# Patient Record
Sex: FEMALE | Race: WHITE | RURAL
[De-identification: names, ages, dates, MRNs, and addresses within clinical notes are randomized per-mention and may not be internally consistent; named-entity substitution may affect disease eponyms.]

---

## 2017-02-17 ENCOUNTER — TELEPHONE (OUTPATIENT)
Dept: INTERNAL MEDICINE CLINIC | Age: 82
End: 2017-02-17

## 2017-02-17 NOTE — TELEPHONE ENCOUNTER
Chief Complaint   Patient presents with   Aleyda Isbell with the patient and she refuses to have her AWV performed, because she states that she can hardly walk and is feeling bad.

## 2017-04-06 DIAGNOSIS — I10 ESSENTIAL HYPERTENSION: ICD-10-CM

## 2017-04-06 RX ORDER — AMILORIDE HYDROCHLORIDE 5 MG/1
TABLET ORAL
Qty: 90 TAB | Refills: 1 | Status: SHIPPED | OUTPATIENT
Start: 2017-04-06 | End: 2017-10-21 | Stop reason: SDUPTHER

## 2017-05-23 ENCOUNTER — TELEPHONE (OUTPATIENT)
Dept: INTERNAL MEDICINE CLINIC | Age: 82
End: 2017-05-23

## 2017-05-23 RX ORDER — LEVOTHYROXINE SODIUM 88 UG/1
TABLET ORAL
Qty: 30 TAB | Refills: 0 | Status: SHIPPED | OUTPATIENT
Start: 2017-05-23 | End: 2017-06-30 | Stop reason: SDUPTHER

## 2017-05-23 NOTE — TELEPHONE ENCOUNTER
Requested Prescriptions     Pending Prescriptions Disp Refills    levothyroxine (SYNTHROID) 88 mcg tablet [Pharmacy Med Name: LEVOTHYROXINE 88 MCG TABLET] 90 Tab 1     Sig: take 1 tablet by mouth once daily BEFORE BREAKFAST FOR THYROID     Last office visit 10/19/16  Last filled 9/22/16  Changes made to medication on last visit none

## 2017-07-26 ENCOUNTER — OFFICE VISIT (OUTPATIENT)
Dept: INTERNAL MEDICINE CLINIC | Age: 82
End: 2017-07-26

## 2017-07-26 VITALS
WEIGHT: 200 LBS | BODY MASS INDEX: 37.76 KG/M2 | RESPIRATION RATE: 18 BRPM | HEIGHT: 61 IN | SYSTOLIC BLOOD PRESSURE: 206 MMHG | OXYGEN SATURATION: 95 % | DIASTOLIC BLOOD PRESSURE: 100 MMHG | TEMPERATURE: 96 F | HEART RATE: 73 BPM

## 2017-07-26 DIAGNOSIS — E11.65 TYPE 2 DIABETES MELLITUS WITH HYPERGLYCEMIA, WITHOUT LONG-TERM CURRENT USE OF INSULIN (HCC): Primary | ICD-10-CM

## 2017-07-26 DIAGNOSIS — Z23 ENCOUNTER FOR IMMUNIZATION: ICD-10-CM

## 2017-07-26 DIAGNOSIS — E08.42 DIABETIC POLYNEUROPATHY ASSOCIATED WITH DIABETES MELLITUS DUE TO UNDERLYING CONDITION (HCC): ICD-10-CM

## 2017-07-26 DIAGNOSIS — E03.9 HYPOTHYROIDISM, UNSPECIFIED TYPE: ICD-10-CM

## 2017-07-26 DIAGNOSIS — I10 ESSENTIAL HYPERTENSION: ICD-10-CM

## 2017-07-26 RX ORDER — LANCETS
EACH MISCELLANEOUS
Qty: 50 EACH | Refills: 6 | Status: SHIPPED | OUTPATIENT
Start: 2017-07-26 | End: 2019-10-14 | Stop reason: ALTCHOICE

## 2017-07-26 RX ORDER — LIDOCAINE 50 MG/G
0.25 OINTMENT TOPICAL
Qty: 60 G | Refills: 5 | Status: SHIPPED | OUTPATIENT
Start: 2017-07-26 | End: 2017-08-15 | Stop reason: ALTCHOICE

## 2017-07-26 RX ORDER — INSULIN PUMP SYRINGE, 3 ML
EACH MISCELLANEOUS
Qty: 1 KIT | Refills: 0 | Status: SHIPPED | OUTPATIENT
Start: 2017-07-26 | End: 2019-10-14 | Stop reason: ALTCHOICE

## 2017-07-26 RX ORDER — LEVOTHYROXINE SODIUM 88 UG/1
TABLET ORAL
Qty: 90 TAB | Refills: 3 | Status: SHIPPED | OUTPATIENT
Start: 2017-07-26 | End: 2018-07-23 | Stop reason: SDUPTHER

## 2017-07-26 NOTE — LETTER
7/28/2017 1:11 PM 
 
Ms. Santana Fees [address] [place] [id number] Dear Larri Fees: 
 
Please find your most recent results below. Resulted Orders HEMOGLOBIN A1C WITH EAG Result Value Ref Range Hemoglobin A1c 7.2 (H) 4.8 - 5.6 % Comment:  
            Pre-diabetes: 5.7 - 6.4 Diabetes: >6.4 Glycemic control for adults with diabetes: <7.0 Estimated average glucose 160 mg/dL Narrative Performed at:  [address]  [id number] : Carlee Horner MD, Phone:  [phone] METABOLIC PANEL, BASIC Result Value Ref Range Glucose 135 (H) 65 - 99 mg/dL BUN 22 8 - 27 mg/dL Creatinine 0.80 0.57 - 1.00 mg/dL GFR est non-AA 68 >59 mL/min/1.73 GFR est AA 78 >59 mL/min/1.73  
 BUN/Creatinine ratio 28 12 - 28 Sodium 138 134 - 144 mmol/L Potassium 4.6 3.5 - 5.2 mmol/L Chloride 100 96 - 106 mmol/L  
 CO2 22 18 - 29 mmol/L Calcium 9.4 8.7 - 10.3 mg/dL Narrative Performed at:  [address]  [id number] : Carlee Horner MD, Phone:  [phone] TSH 3RD GENERATION Result Value Ref Range TSH 3.430 0.450 - 4.500 uIU/mL Narrative Performed at:  [address]  [id number] : Carlee Horner MD, Phone:  [phone] RECOMMENDATIONS: 
The results of your most recent labs show normal/ stable results. Please follow up as scheduled. Please call me if you have any questions: [phone] Sincerely, Sue Pinto MD

## 2017-07-26 NOTE — PROGRESS NOTES
Subjective:     Eric Dixon is a 80 y.o. female seen for follow-up of diabetes. Refuses MWV. Gets very anxious whenever she comes to the doctor states she has whitecoat hypertension and when her blood pressures checked at home it is okay. She has had hypoglycemic attacks. .no  Blood sugar control has been unknown, meter broke  She has diabetes and hypertension. She wants to start checking her blood sugars. Eric Dixon has the additional concern of feet hurt and there is numbness and tingling. Takes her thyroid pill daily. Recently saw her eye doctor. Diabetic Review of Systems: no polyuria or polydipsia, no chest pain, dyspnea or TIA's, no numbness, tingling or pain in extremities. Allergies   Allergen Reactions    Sulfa (Sulfonamide Antibiotics) Unproven on Challenge       Diet and Lifestyle: follows a diabetic diet regularly, nonsmoker. Social History   Substance Use Topics    Smoking status: Former Smoker     Packs/day: 1.00     Years: 20.00     Types: Cigarettes     Quit date: 4/26/2003    Smokeless tobacco: Never Used    Alcohol use No        Lab Results  Component Value Date/Time   Hemoglobin A1c 7.2 07/26/2017 11:16 AM   Hemoglobin A1c 7.0 09/22/2016 12:21 PM   Hemoglobin A1c 7.4 11/06/2014 08:10 AM   Glucose 135 07/26/2017 11:16 AM   Microalb/Creat ratio (ug/mg creat.) 462.6 09/22/2016 12:40 PM   LDL, calculated 142 09/22/2016 12:21 PM   Creatinine 0.80 07/26/2017 11:16 AM      Lab Results  Component Value Date/Time   ALT (SGPT) 25 09/22/2016 12:21 PM   AST (SGOT) 25 09/22/2016 12:21 PM   Alk.  phosphatase 48 09/22/2016 12:21 PM   Bilirubin, total 0.3 09/22/2016 12:21 PM   Albumin 4.2 09/22/2016 12:21 PM   Protein, total 7.3 09/22/2016 12:21 PM   PLATELET 283 17/57/6539 12:21 PM       Lab Results  Component Value Date/Time   GFR est non-AA 68 07/26/2017 11:16 AM   GFR est AA 78 07/26/2017 11:16 AM   Creatinine 0.80 07/26/2017 11:16 AM   BUN 22 07/26/2017 11:16 AM   Sodium 138 07/26/2017 11:16 AM   Potassium 4.6 07/26/2017 11:16 AM   Chloride 100 07/26/2017 11:16 AM   CO2 22 07/26/2017 11:16 AM   Lab Results  Component Value Date/Time   TSH 3.430 07/26/2017 11:16 AM   T3 Uptake 32 07/12/2010 09:05 AM   T4, Free 1.45 07/18/2014 08:59 AM   T4, Total 8.5 07/12/2010 09:05 AM      Lab Results   Component Value Date/Time    Glucose 135 07/26/2017 11:16 AM                     Review of Systems  Pertinent items are noted in HPI. Objective:     Significant for the following  Elderly chronic ill    Visit Vitals    BP (!) 209/87 (BP 1 Location: Left arm, BP Patient Position: Sitting)    Pulse 73    Temp 96 °F (35.6 °C) (Oral)    Resp 18    Ht 5' 1\" (1.549 m)    Wt 200 lb (90.7 kg)    SpO2 95%    BMI 37.79 kg/m2     Appearance: alert, well appearing, and in no distress. Exam: heart sounds normal rate, regular rhythm, normal S1, S2, no murmurs, rubs, clicks or gallops, chest clear  Foot exam: Diabetic foot exam was performed. No obvious sores or red lesions. Sensation checked by monofilament exam which was did not feel the filament. Dorsalis pedis pulse was nonpalpable. Lab review: orders written for new lab studies as appropriate; see orders. Assessment/Plan:     Follow-up diabetes stable. Diabetic issues reviewed with her: all medications, side effects and compliance discussed carefully, foot care discussed and Podiatry visits discussed, annual eye examinations at Ophthalmology discussed and glycohemoglobin and other lab monitoring discussed. Chronic Conditions Addressed Today     1. Hypothyroidism     Relevant Medications     levothyroxine (SYNTHROID) 88 mcg tablet     Other Relevant Orders     TSH 3RD GENERATION (Completed)     FL HANDLG&/OR CONVEY OF SPEC FOR TR OFFICE TO LAB     FL COLLECTION VENOUS BLOOD,VENIPUNCTURE    2.  Hypertension     Relevant Orders     METABOLIC PANEL, BASIC (Completed)     FL HANDLG&/OR CONVEY OF SPEC FOR TR OFFICE TO LAB     FL COLLECTION VENOUS BLOOD,VENIPUNCTURE    3. Diabetic polyneuropathy associated with diabetes mellitus due to underlying condition ([facility])     Relevant Medications     levothyroxine (SYNTHROID) 88 mcg tablet     Other Relevant Orders     REFERRAL TO PODIATRY     SC HANDLG&/OR CONVEY OF SPEC FOR TR OFFICE TO LAB     SC COLLECTION VENOUS BLOOD,VENIPUNCTURE      Acute Diagnoses Addressed Today     Type 2 diabetes mellitus with hyperglycemia, without long-term current use of insulin ([facility])    -  Primary        Relevant Medications        levothyroxine (SYNTHROID) 88 mcg tablet        Other Relevant Orders        REFERRAL TO PODIATRY        HEMOGLOBIN A1C WITH EAG (Completed)        SC HANDLG&/OR CONVEY OF SPEC FOR TR OFFICE TO LAB        SC COLLECTION VENOUS BLOOD,VENIPUNCTURE    Encounter for immunization            Relevant Orders        PNEUMOCOCCAL CONJ VACCINE 13 VALENT IM (Completed)          ICD-10-CM ICD-9-CM    1. Type 2 diabetes mellitus with hyperglycemia, without long-term current use of insulin ([facility]) E11.65 250.00 REFERRAL TO PODIATRY     790.29 HEMOGLOBIN A1C WITH EAG      SC HANDLG&/OR CONVEY OF SPEC FOR TR OFFICE TO LAB      SC COLLECTION VENOUS BLOOD,VENIPUNCTURE   2. Diabetic polyneuropathy associated with diabetes mellitus due to underlying condition ([facility]) E08.42 249.60 REFERRAL TO PODIATRY     357.2 SC HANDLG&/OR CONVEY OF SPEC FOR TR OFFICE TO LAB      SC COLLECTION VENOUS BLOOD,VENIPUNCTURE   3. Essential hypertension R78 985.1 METABOLIC PANEL, BASIC      SC HANDLG&/OR CONVEY OF SPEC FOR TR OFFICE TO LAB      SC COLLECTION VENOUS BLOOD,VENIPUNCTURE   4. Hypothyroidism, unspecified type E03.9 244.9 TSH 3RD GENERATION      SC HANDLG&/OR CONVEY OF SPEC FOR TR OFFICE TO LAB      SC COLLECTION VENOUS BLOOD,VENIPUNCTURE   5.  Encounter for immunization Z23 V03.89 PNEUMOCOCCAL CONJ VACCINE 13 VALENT IM     Orders Placed This Encounter    PNEUMOCOCCAL CONJ VACCINE 13 VALENT IM    HEMOGLOBIN A1C WITH EAG    METABOLIC PANEL, BASIC    TSH 3RD GENERATION    REFERRAL TO PODIATRY     Referral Priority:   Routine     Referral Type:   Consultation     Referral Reason:   Specialty Services Required     Referred to Provider:   Toya Peres DPM    [place] HANDLG&/OR CONVEY OF SPEC FOR TR OFFICE TO LAB    [place] COLLECTION VENOUS BLOOD,VENIPUNCTURE    Blood-Glucose Meter monitoring kit     Sig: Check blood sugar daily Hilda     Dispense:  1 Kit     Refill:  0     E11.65 Dm    glucose blood VI test strips (ASCENSIA AUTODISC VI, ONE TOUCH ULTRA TEST VI) strip     Sig: daily     Dispense:  50 Strip     Refill:  6    Lancets misc     Sig: daily     Dispense:  50 Each     Refill:  6    lidocaine (XYLOCAINE) 5 % ointment     Sig: Apply 0.25 g to affected area three (3) times daily as needed for Pain. Dispense:  60 g     Refill:  5    levothyroxine (SYNTHROID) 88 mcg tablet     Sig: take 1 tablet by mouth once daily BEFORE BREAKFAST FOR THYROID     Dispense:  90 Tab     Refill:  3     Trial of lidocaine ointment for her pain consider lidocaine patches. Diabetes seems stable, diet controlled only. Hypertension blood pressure very elevated, suspect white coat in nature. Check blood pressures at home bring in diary.   Follow-up Disposition:  Return in about 3 months (around 10/26/2017) for medicare annual.

## 2017-07-26 NOTE — MR AVS SNAPSHOT
Visit Information Date & Time Provider Department Dept. Phone Encounter #  
 7/26/2017 10:15 AM Cassie Matias  [facility] [id number] Follow-up Instructions Return in about 3 months (around 10/26/2017) for medicare annual.  
  
Upcoming Health Maintenance Date Due  
 EYE EXAM RETINAL OR DILATED Q1 3/29/1943 DTaP/Tdap/Td series (1 - Tdap) 3/29/1954 ZOSTER VACCINE AGE 60> 1/29/1993 GLAUCOMA SCREENING Q2Y 3/29/1998 OSTEOPOROSIS SCREENING (DEXA) 3/29/1998 Pneumococcal 65+ Low/Medium Risk (1 of 2 - PCV13) 3/29/1998 MEDICARE YEARLY EXAM 3/29/1998 HEMOGLOBIN A1C Q6M 3/22/2017 INFLUENZA AGE 9 TO ADULT 8/1/2017 MICROALBUMIN Q1 9/22/2017 LIPID PANEL Q1 9/22/2017 FOOT EXAM Q1 7/26/2018 Allergies as of 7/26/2017  Review Complete On: 7/26/2017 By: Cassie Matias MD  
  
 Severity Noted Reaction Type Reactions Sulfa (Sulfonamide Antibiotics)  04/26/2010    Unproven on Challenge Current Immunizations  Reviewed on 7/26/2017 Name Date Pneumococcal Conjugate (PCV-13)  Incomplete Reviewed by Cassie Matias MD on 7/26/2017 at 11:49 AM  
You Were Diagnosed With   
  
 Codes Comments Type 2 diabetes mellitus with hyperglycemia, without long-term current use of insulin ([facility])    -  Primary ICD-10-CM: E11.65 ICD-9-CM: 250.00, 790.29 Diabetic polyneuropathy associated with diabetes mellitus due to underlying condition ([facility]     ICD-10-CM: O96.74 
ICD-9-CM: 249.60, 357.2 Essential hypertension     ICD-10-CM: I10 
ICD-9-CM: 401.9 Hypothyroidism, unspecified type     ICD-10-CM: E03.9 ICD-9-CM: 244.9 Encounter for immunization     ICD-10-CM: R58 ICD-9-CM: V03.89 Vitals BP Pulse Temp Resp Height(growth percentile) Weight(growth percentile) (!) 206/100 73 96 °F (35.6 °C) (Oral) 18 5' 1\" (1.549 m) 200 lb (90.7 kg) SpO2 BMI OB Status Smoking Status 95% 37.79 kg/m2 Postmenopausal Former Smoker Vitals History BMI and BSA Data Body Mass Index Body Surface Area  
 37.79 kg/m 2 1.98 m 2 Preferred Pharmacy Pharmacy Name Phone Robbie Leyva [address] [facility] [phone] Your Updated Medication List  
  
   
This list is accurate as of: 7/26/17 11:51 AM.  Always use your most recent med list.  
  
  
  
  
 aMILoride 5 mg tablet Commonly known as:  MIDAMOR  
take 1 tablet by mouth once daily Blood-Glucose Meter monitoring kit Check blood sugar daily Hilda  
  
 glucose blood VI test strips strip Commonly known as:  ASCENSIA AUTODISC VI, ONE TOUCH ULTRA TEST VI  
daily Lancets Misc  
daily  
  
 levothyroxine 88 mcg tablet Commonly known as:  SYNTHROID  
take 1 tablet by mouth once daily BEFORE BREAKFAST FOR THYROID  
  
 lidocaine 5 % ointment Commonly known as:  XYLOCAINE Apply 0.25 g to affected area three (3) times daily as needed for Pain. lisinopril 10 mg tablet Commonly known as:  Therisa Semen Take 1 Tab by mouth two (2) times a day. metFORMIN 500 mg tablet Commonly known as:  GLUCOPHAGE  
take 1 tablet by mouth once daily WITH BREAKFAST  
  
 propranolol 20 mg tablet Commonly known as:  INDERAL Take 1 Tab by mouth three (3) times daily. Prescriptions Printed Refills Blood-Glucose Meter monitoring kit 0 Sig: Check blood sugar daily Hilda Class: Print  
 glucose blood VI test strips (ASCENSIA AUTODISC VI, ONE TOUCH ULTRA TEST VI) strip 6 Sig: daily Class: Print Lancets misc 6 Sig: daily Class: Print Prescriptions Sent to Pharmacy Refills  
 lidocaine (XYLOCAINE) 5 % ointment 5 Sig: Apply 0.25 g to affected area three (3) times daily as needed for Pain. Class: Normal  
 Pharmacy: [facility] [address] [facility] Ph #: [phone]  Route: Topical  
 levothyroxine (SYNTHROID) 88 mcg tablet 3 Sig: take 1 tablet by mouth once daily BEFORE BREAKFAST FOR THYROID Class: Normal  
 Pharmacy: [facility] [place] [address] [address] Ph #: [phone] We Performed the Following HEMOGLOBIN A1C WITH EAG [83990 CPT(R)] METABOLIC PANEL, BASIC [64510 CPT(R)] PNEUMOCOCCAL CONJ VACCINE 13 VALENT IM [id number] CPT(R)] [place] COLLECTION VENOUS BLOOD,VENIPUNCTURE [id number] CPT(R)] [place] HANDLG&/OR CONVEY OF SPEC FOR TR OFFICE TO LAB [22436 CPT(R)] REFERRAL TO PODIATRY [REF90 Custom] Comments:  
 Please evaluate patient for Dm neuropathy and foot issues. TSH 3RD GENERATION [45444 CPT(R)] Follow-up Instructions Return in about 3 months (around 10/26/2017) for medicare annual.  
  
  
Referral Information Referral ID Referred By Referred To  
  
 [id number] [address]  Phone: [phone] Fax: [phone] Visits Status Start Date End Date 1 New Request 17 If your referral has a status of pending review or denied, additional information will be sent to support the outcome of this decision. Introducing [facility]! Lucy Lopez introduces Accion patient portal. Now you can access parts of your medical record, email your doctor's office, and request medication refills online. 1. In your internet browser, go to [email or web address] 2. Click on the First Time User? Click Here link in the Sign In box. You will see the New Member Sign Up page. 3. Enter your Accion Access Code exactly as it appears below. You will not need to use this code after youve completed the sign-up process. If you do not sign up before the expiration date, you must request a new code. · Accion Access Code: [email or web address] Expires: 10/24/2017 11:51 AM 
 
 4. Enter the last four digits of your Social Security Number (xxxx) and Date of Birth (mm/dd/yyyy) as indicated and click Submit. You will be taken to the next sign-up page. 5. Create a Italia Online ID. This will be your Italia Online login ID and cannot be changed, so think of one that is secure and easy to remember. 6. Create a Italia Online password. You can change your password at any time. 7. Enter your Password Reset Question and Answer. This can be used at a later time if you forget your password. 8. Enter your e-mail address. You will receive e-mail notification when new information is available in [address] 9. Click Sign Up. You can now view and download portions of your medical record. 10. Click the Download Summary menu link to download a portable copy of your medical information. If you have questions, please visit the Frequently Asked Questions section of the Italia Online website. Remember, Italia Online is NOT to be used for urgent needs. For medical emergencies, dial 911. Now available from your iPhone and Android! Please provide this summary of care documentation to your next provider. Your primary care clinician is listed as Damien Gowers. If you have any questions after today's visit, please call [phone].

## 2017-07-26 NOTE — PROGRESS NOTES
Chief Complaint   Patient presents with    Foot Pain     I have reviewed the patient's medical history in detail and updated the computerized patient record. Health Maintenance reviewed. 1. Have you been to the ER, urgent care clinic since your last visit? Hospitalized since your last visit?no    2. Have you seen or consulted any other health care providers outside of the [address] [place] [address] since your last visit? Include any pap smears or colon screening. no    Do you have an [address] in place in the event that you have a healthcare crisis that could impact your decision making as it pertains to your health?no    Would you like information about the [address] [place] [address] given. no

## 2017-07-27 LAB
BUN SERPL-MCNC: 22 MG/DL (ref 8–27)
BUN/CREAT SERPL: 28 (ref 12–28)
CALCIUM SERPL-MCNC: 9.4 MG/DL (ref 8.7–10.3)
CHLORIDE SERPL-SCNC: 100 MMOL/L (ref 96–106)
CO2 SERPL-SCNC: 22 MMOL/L (ref 18–29)
CREAT SERPL-MCNC: 0.8 MG/DL (ref 0.57–1)
EST. AVERAGE GLUCOSE BLD GHB EST-MCNC: 160 MG/DL
GLUCOSE SERPL-MCNC: 135 MG/DL (ref 65–99)
HBA1C MFR BLD: 7.2 % (ref 4.8–5.6)
POTASSIUM SERPL-SCNC: 4.6 MMOL/L (ref 3.5–5.2)
SODIUM SERPL-SCNC: 138 MMOL/L (ref 134–144)
TSH SERPL DL<=0.005 MIU/L-ACNC: 3.43 UIU/ML (ref 0.45–4.5)

## 2017-07-29 PROBLEM — E08.42 DIABETIC POLYNEUROPATHY ASSOCIATED WITH DIABETES MELLITUS DUE TO UNDERLYING CONDITION (HCC): Status: ACTIVE | Noted: 2017-07-29

## 2017-08-01 ENCOUNTER — TELEPHONE (OUTPATIENT)
Dept: INTERNAL MEDICINE CLINIC | Age: 82
End: 2017-08-01

## 2017-08-01 NOTE — TELEPHONE ENCOUNTER
8/1/17 @ 10:19 AM Called , PA initiated with [facility] for Lidocaine 5% ointment. Turn around time for outcome, 24 to 72 hours. Decision will be faxed to [phone] will be mailed to patient. Case ID: [id number].

## 2017-08-14 NOTE — TELEPHONE ENCOUNTER
PA request for Lidocaine ointment has been denied - this drug is use for a diabetic nerve condition is not a medically accepted diagnosis for an off label drug such as Lidocaine 5% - message sent to Dr Kasey Michel LPN  3/67/3056  7:76 PM

## 2017-08-15 ENCOUNTER — TELEPHONE (OUTPATIENT)
Dept: INTERNAL MEDICINE CLINIC | Age: 82
End: 2017-08-15

## 2017-08-15 RX ORDER — LIDOCAINE 50 MG/G
1 PATCH TOPICAL EVERY 12 HOURS
Qty: 1 PACKAGE | Refills: 5 | Status: SHIPPED | OUTPATIENT
Start: 2017-08-15 | End: 2019-10-14 | Stop reason: ALTCHOICE

## 2017-08-15 NOTE — TELEPHONE ENCOUNTER
8/15/17 @ 14:10 Called [phone] for PA on Lidocaine 5% patch with, PA rep Nolan. CaseID: [id number]. Case pending review. Outcome will be faxed to office within 24 to 67 hours,and letter mailed to patient.

## 2017-08-16 NOTE — TELEPHONE ENCOUNTER
[facility] approval of PA request for Lidocaine 5% patch - good until 2/12/18  Froilan Gonzales LPN  2/22/9612  9:26 PM

## 2017-08-31 RX ORDER — PROPRANOLOL HYDROCHLORIDE 20 MG/1
20 TABLET ORAL 3 TIMES DAILY
Qty: 270 TAB | Refills: 1 | Status: SHIPPED | OUTPATIENT
Start: 2017-08-31 | End: 2018-01-09 | Stop reason: SDUPTHER

## 2017-08-31 NOTE — TELEPHONE ENCOUNTER
Requested Prescriptions     Pending Prescriptions Disp Refills    propranolol (INDERAL) 20 mg tablet 270 Tab 1     Sig: Take 1 Tab by mouth three (3) times daily. Pt called and said she needs new prescription sent to the pharmacy. Pharmacist said that it was written wrong. Ms. Izabela Lugo said she takes 3 a day not 1 a day.

## 2017-08-31 NOTE — TELEPHONE ENCOUNTER
Last office visit:  7/26/17  Last filled:  10/19/16 #270 X 1 refill (propranolol)  No changes  Follow up in 1 month with Dr Danelle Blair

## 2017-10-21 DIAGNOSIS — I10 ESSENTIAL HYPERTENSION: ICD-10-CM

## 2017-10-21 RX ORDER — AMILORIDE HYDROCHLORIDE 5 MG/1
TABLET ORAL
Qty: 90 TAB | Refills: 1 | Status: SHIPPED | OUTPATIENT
Start: 2017-10-21 | End: 2018-06-03 | Stop reason: SDUPTHER

## 2018-01-09 RX ORDER — PROPRANOLOL HYDROCHLORIDE 20 MG/1
20 TABLET ORAL 3 TIMES DAILY
Qty: 270 TAB | Refills: 1 | Status: SHIPPED | OUTPATIENT
Start: 2018-01-09 | End: 2018-09-10 | Stop reason: SDUPTHER

## 2018-01-09 NOTE — TELEPHONE ENCOUNTER
Requested Prescriptions     Pending Prescriptions Disp Refills    propranolol (INDERAL) 20 mg tablet 270 Tab 1     Sig: Take 1 Tab by mouth three (3) times daily.

## 2018-01-09 NOTE — TELEPHONE ENCOUNTER
Last office visit:  7/26/17  Last filled:  Propranolol 8/31/17 #270 X 1  No changes    No follow up scheduled

## 2018-01-10 NOTE — TELEPHONE ENCOUNTER
LM X 2 that I need a return call to let me know which pharmacy she wants medication sent to  Corey Boswell LPN  2/37/1432  6:24 PM

## 2018-01-12 ENCOUNTER — TELEPHONE (OUTPATIENT)
Dept: INTERNAL MEDICINE CLINIC | Age: 83
End: 2018-01-12

## 2018-01-15 ENCOUNTER — TELEPHONE (OUTPATIENT)
Dept: INTERNAL MEDICINE CLINIC | Age: 83
End: 2018-01-15

## 2018-01-15 NOTE — TELEPHONE ENCOUNTER
Pt called and would like to know if her prescription can be sent to [facility] in [address] [facility]. She said she has a hard time getting to the office.

## 2018-01-15 NOTE — TELEPHONE ENCOUNTER
Per order of Britni Nichole MD called in prescription for Propranolol 20mg #270 X 1 [facility] [address] [facility] [place]  6/56/8792  31:81 AM

## 2018-06-03 DIAGNOSIS — I10 ESSENTIAL HYPERTENSION: ICD-10-CM

## 2018-06-04 RX ORDER — AMILORIDE HYDROCHLORIDE 5 MG/1
TABLET ORAL
Qty: 90 TAB | Refills: 1 | Status: SHIPPED | OUTPATIENT
Start: 2018-06-04 | End: 2018-12-04 | Stop reason: SDUPTHER

## 2018-09-10 RX ORDER — PROPRANOLOL HYDROCHLORIDE 20 MG/1
TABLET ORAL
Qty: 270 TAB | Refills: 1 | Status: SHIPPED | OUTPATIENT
Start: 2018-09-10 | End: 2019-03-04 | Stop reason: SDUPTHER

## 2018-12-04 DIAGNOSIS — I10 ESSENTIAL HYPERTENSION: ICD-10-CM

## 2018-12-04 RX ORDER — AMILORIDE HYDROCHLORIDE 5 MG/1
TABLET ORAL
Qty: 90 TAB | Refills: 1 | Status: SHIPPED | OUTPATIENT
Start: 2018-12-04 | End: 2019-06-12 | Stop reason: SDUPTHER

## 2019-01-22 RX ORDER — LEVOTHYROXINE SODIUM 88 UG/1
TABLET ORAL
Qty: 90 TAB | Refills: 1 | Status: SHIPPED | OUTPATIENT
Start: 2019-01-22 | End: 2019-07-29 | Stop reason: SDUPTHER

## 2019-03-04 RX ORDER — PROPRANOLOL HYDROCHLORIDE 20 MG/1
TABLET ORAL
Qty: 270 TAB | Refills: 1 | Status: SHIPPED | OUTPATIENT
Start: 2019-03-04 | End: 2019-08-28 | Stop reason: SDUPTHER

## 2019-06-12 DIAGNOSIS — I10 ESSENTIAL HYPERTENSION: ICD-10-CM

## 2019-06-12 RX ORDER — AMILORIDE HYDROCHLORIDE 5 MG/1
TABLET ORAL
Qty: 90 TAB | Refills: 1 | Status: SHIPPED | OUTPATIENT
Start: 2019-06-12 | End: 2019-10-14 | Stop reason: SDUPTHER

## 2019-10-14 ENCOUNTER — OFFICE VISIT (OUTPATIENT)
Dept: INTERNAL MEDICINE CLINIC | Age: 84
End: 2019-10-14

## 2019-10-14 VITALS
SYSTOLIC BLOOD PRESSURE: 179 MMHG | HEART RATE: 73 BPM | TEMPERATURE: 97.5 F | HEIGHT: 61 IN | DIASTOLIC BLOOD PRESSURE: 82 MMHG | RESPIRATION RATE: 18 BRPM | OXYGEN SATURATION: 96 % | BODY MASS INDEX: 37.38 KG/M2 | WEIGHT: 198 LBS

## 2019-10-14 DIAGNOSIS — E03.9 HYPOTHYROIDISM, UNSPECIFIED TYPE: ICD-10-CM

## 2019-10-14 DIAGNOSIS — I10 ESSENTIAL HYPERTENSION: ICD-10-CM

## 2019-10-14 DIAGNOSIS — Z13.39 SCREENING FOR ALCOHOLISM: ICD-10-CM

## 2019-10-14 DIAGNOSIS — Z13.31 SCREENING FOR DEPRESSION: ICD-10-CM

## 2019-10-14 DIAGNOSIS — Z00.00 MEDICARE ANNUAL WELLNESS VISIT, SUBSEQUENT: Primary | ICD-10-CM

## 2019-10-14 DIAGNOSIS — E66.01 SEVERE OBESITY (HCC): ICD-10-CM

## 2019-10-14 DIAGNOSIS — E78.5 HYPERLIPIDEMIA, UNSPECIFIED HYPERLIPIDEMIA TYPE: ICD-10-CM

## 2019-10-14 DIAGNOSIS — E11.40 TYPE 2 DIABETES, CONTROLLED, WITH NEUROPATHY (HCC): ICD-10-CM

## 2019-10-14 RX ORDER — LEVOTHYROXINE SODIUM 88 UG/1
TABLET ORAL
Qty: 90 TAB | Refills: 1 | Status: SHIPPED | OUTPATIENT
Start: 2019-10-14 | End: 2020-04-27

## 2019-10-14 RX ORDER — AMILORIDE HYDROCHLORIDE 5 MG/1
TABLET ORAL
Qty: 90 TAB | Refills: 1 | Status: SHIPPED | OUTPATIENT
Start: 2019-10-14 | End: 2020-06-21

## 2019-10-14 RX ORDER — VALSARTAN 40 MG/1
40 TABLET ORAL DAILY
Qty: 30 TAB | Refills: 2 | Status: SHIPPED | OUTPATIENT
Start: 2019-10-14 | End: 2019-11-11 | Stop reason: SINTOL

## 2019-10-14 RX ORDER — PROPRANOLOL HYDROCHLORIDE 20 MG/1
TABLET ORAL
Qty: 270 TAB | Refills: 1 | Status: SHIPPED | OUTPATIENT
Start: 2019-10-14 | End: 2020-05-27

## 2019-10-14 NOTE — PROGRESS NOTES
Chief Complaint   Patient presents with   [facility] Annual Wellness Visit     I have reviewed the patient's medical history in detail and updated the computerized patient record. Health Maintenance reviewed. 1. Have you been to the ER, urgent care clinic since your last visit? Hospitalized since your last visit?no    2. Have you seen or consulted any other health care providers outside of the [address] [place] [address] [place] since your last visit? Include any pap smears or colon screening. No      Encouraged pt to discuss pt's wishes with spouse/partner/family and bring them in the next appt to follow thru with the Advanced Directive    Fall Risk Assessment, last 12 mths 10/14/2019   Able to walk? Yes   Fall in past 12 months? No       3 most recent PHQ Screens 10/14/2019   Little interest or pleasure in doing things Several days   Feeling down, depressed, irritable, or hopeless Several days   Total Score PHQ 2 2       Abuse Screening Questionnaire 10/14/2019   Do you ever feel afraid of your partner? N   Are you in a relationship with someone who physically or mentally threatens you? N   Is it safe for you to go home?  Y       ADL Assessment 10/14/2019   Feeding yourself No Help Needed   Getting from bed to chair No Help Needed   Getting dressed No Help Needed   Bathing or showering No Help Needed   Walk across the room (includes cane/walker) No Help Needed   Using the telphone No Help Needed   Taking your medications No Help Needed   Preparing meals No Help Needed   Managing money (expenses/bills) No Help Needed   Moderately strenuous housework (laundry) No Help Needed   Shopping for personal items (toiletries/medicines) No Help Needed   Shopping for groceries No Help Needed   Driving No Help Needed   Climbing a flight of stairs Help Needed   Getting to places beyond walking distances No Help Needed

## 2019-10-14 NOTE — PROGRESS NOTES
This is the Subsequent Medicare Annual Wellness Exam, performed 12 months or more after the Initial AWV or the last Subsequent AWV    I have reviewed the patient's medical history in detail and updated the computerized patient record. History     Here with daughter, no c/o, not had Dm care in a while, No hypos, last A1C was 7.2 in 2017. Eneergy OK Wt stable    Past Medical History:   Diagnosis Date    Allergic rhinitis     Diabetes mellitus 11/2014    Grave's disease 2001    Dr. Tania Castaneda Hyperlipidemia     Hypertension     Hypothyroidism 2001    Osteopenia 2006    Peripheral neuropathy       Past Surgical History:   Procedure Laterality Date    [address]  1994   [address]     Current Outpatient Medications   Medication Sig Dispense Refill    propranolol (INDERAL) 20 mg tablet take 1 tablet by mouth three times a day 270 Tab 0    levothyroxine (SYNTHROID) 88 mcg tablet take 1 tablet by mouth once daily before breakfast 90 Tab 0    aMILoride (MIDAMOR) 5 mg tablet take 1 tablet by mouth daily 90 Tab 1    lidocaine (LIDODERM) 5 % 1 Patch by TransDERmal route every twelve (12) hours. Apply patch to the affected area for 12 hours a day and remove for 12 hours a day. 1 Package 5    Blood-Glucose Meter monitoring kit Check blood sugar daily Hilda 1 Kit 0    glucose blood VI test strips (ASCENSIA AUTODISC VI, ONE TOUCH ULTRA TEST VI) strip daily 50 Strip 6    Lancets misc daily 50 Each 6    lisinopril (PRINIVIL, ZESTRIL) 10 mg tablet Take 1 Tab by mouth two (2) times a day. 180 Tab 1    metFORMIN (GLUCOPHAGE) 500 mg tablet take 1 tablet by mouth once daily WITH BREAKFAST 90 Tab 3     Allergies   Allergen Reactions    Sulfa (Sulfonamide Antibiotics) Unproven on Challenge     History reviewed. No pertinent family history.   Social History     Tobacco Use    Smoking status: Former Smoker     Packs/day: 1.00     Years: 20.00     Pack years: 20.00     Types: Cigarettes Last attempt to quit: 2003     Years since quittin.4    Smokeless tobacco: Never Used   Substance Use Topics    Alcohol use: No     Patient Active Problem List   Diagnosis Code    Hypertension I10    Hyperlipidemia E78.5    Type 2 diabetes, controlled, with neuropathy [facility] E11.40    Hypothyroidism E03.9    Diabetic polyneuropathy associated with diabetes mellitus due to underlying condition ([facility] E08.42    Severe obesity ([facility] E66.01       Depression Risk Factor Screening:     3 most recent PHQ Screens 10/14/2019   Little interest or pleasure in doing things Several days   Feeling down, depressed, irritable, or hopeless Several days   Total Score PHQ 2 2     Alcohol Risk Factor Screening: You do not drink alcohol or very rarely. Functional Ability and Level of Safety:   Hearing Loss  Hearing is good. Activities of Daily Living  The home contains: no safety equipment. and cane  Patient does total self care    Fall Risk  Fall Risk Assessment, last 12 mths 10/14/2019   Able to walk? Yes   Fall in past 12 months? No       Abuse Screen  Patient is not abused    Cognitive Screening   Evaluation of Cognitive Function:  Has your family/caregiver stated any concerns about your memory: no  Normal, Clock Drawing test    Patient Care Team   Patient Care Team:  Mara Farah MD as PCP - General (Family Practice)  [id number]    Visit Vitals  /82   Pulse 73   Temp 97.5 °F (36.4 °C) (Oral)   Resp 18   Ht 5' 1\" (1.549 m)   Wt 198 lb (89.8 kg)   SpO2 96%   BMI 37.41 kg/m²     WD WN female NAD  Heart RRR without murmers clicks or rubs  Lungs CTA  Abdo soft nontender  Ext no edema    Assessment/Plan   Education and counseling provided:  End-of-Life planning (with patient's consent)  Influenza Vaccine  Cardiovascular screening blood test      ICD-10-CM ICD-9-CM    1. Medicare annual wellness visit, subsequent Z00.00 V70.0    2. Severe obesity ([facility] E66.01 278.01    3.  Screening for alcoholism Z13.39 V79.1 NC ANNUAL ALCOHOL SCREEN 15 MIN   4. Screening for depression Z13.31 V79.0 DEPRESSION SCREEN ANNUAL   5. Essential hypertension I10 401.9 valsartan (DIOVAN) 40 mg tablet      propranolol (INDERAL) 20 mg tablet      aMILoride (MIDAMOR) 5 mg tablet      METABOLIC PANEL, COMPREHENSIVE      CBC W/O DIFF   6. Hyperlipidemia, unspecified hyperlipidemia type E78.5 272.4 LIPID PANEL   7. Type 2 diabetes, controlled, with neuropathy (HCC) E11.40 250.60 REFERRAL TO OPTOMETRY     357.2 HEMOGLOBIN A1C WITH EAG   8. Hypothyroidism, unspecified type E03.9 244.9 levothyroxine (SYNTHROID) 88 mcg tablet      TSH 3RD GENERATION     Orders Placed This Encounter    Depression Screen Annual    TSH 3RD GENERATION     Standing Status:   Future     Standing Expiration Date:   2/07/7346    METABOLIC PANEL, COMPREHENSIVE     Standing Status:   Future     Standing Expiration Date:   4/15/2020    HEMOGLOBIN A1C WITH EAG     Standing Status:   Future     Standing Expiration Date:   4/12/2020    LIPID PANEL     Standing Status:   Future     Standing Expiration Date:   4/15/2020    CBC W/O DIFF     Standing Status:   Future     Standing Expiration Date:   4/15/2020    REFERRAL TO OPTOMETRY     Referral Priority:   Routine     Referral Type:   Consultation     Referral Reason:   Specialty Services Required     Referred to Provider:   Swathi Mike III, OD    Annual  Alcohol Screen 15 min ()    valsartan (DIOVAN) 40 mg tablet     Sig: Take 1 Tab by mouth daily. Dispense:  30 Tab     Refill:  2    propranolol (INDERAL) 20 mg tablet     Sig: take 1 tablet by mouth three times a day     Dispense:  270 Tab     Refill:  1     Appointment needed to refill this medication again.     levothyroxine (SYNTHROID) 88 mcg tablet     Sig: take 1 tablet by mouth once daily before breakfast     Dispense:  90 Tab     Refill:  1    aMILoride (MIDAMOR) 5 mg tablet     Sig: take 1 tablet by mouth daily     Dispense: 90 Tab     Refill:  1     HTN needs to be better, start diovan  Discussed possible side affects, precautions, and drug interactions and possible benefits of the medication(s). Dm labs to eval  euthyroid    Health Maintenance Due   Topic Date Due    EYE EXAM RETINAL OR DILATED  03/29/1943    DTaP/Tdap/Td series (1 - Tdap) 03/29/1954    Shingrix Vaccine Age 50> (1 of 2) 03/29/1983    GLAUCOMA SCREENING Q2Y  03/29/1998    Bone Densitometry (Dexa) Screening  03/29/1998    MICROALBUMIN Q1  09/22/2017    LIPID PANEL Q1  09/22/2017    HEMOGLOBIN A1C Q6M  01/26/2018    FOOT EXAM Q1  07/26/2018    Pneumococcal 65+ years (2 of 2 - PPSV23) 07/26/2018     Follow-up and Dispositions    · Return in about 4 weeks (around 11/11/2019) for routine follow up.

## 2019-10-14 NOTE — PATIENT INSTRUCTIONS
Medicare Wellness Visit, Female The best way to live healthy is to have a lifestyle where you eat a well-balanced diet, exercise regularly, limit alcohol use, and quit all forms of tobacco/nicotine, if applicable. Regular preventive services are another way to keep healthy. Preventive services (vaccines, screening tests, monitoring & exams) can help personalize your care plan, which helps you manage your own care. Screening tests can find health problems at the earliest stages, when they are easiest to treat. [facility] follows the current, evidence-based guidelines published by the [facility] ([facility]) when recommending preventive services for our patients. Because we follow these guidelines, sometimes recommendations change over time as research supports it. (For example, mammograms used to be recommended annually. Even though Medicare will still pay for an annual mammogram, the newer guidelines recommend a mammogram every two years for women of average risk.) Of course, you and your doctor may decide to screen more often for some diseases, based on your risk and your health status. Preventive services for you include: - Medicare offers their members a free annual wellness visit, which is time for you and your primary care provider to discuss and plan for your preventive service needs. Take advantage of this benefit every year! 
-All adults over the age of 72 should receive the recommended pneumonia vaccines. Current USPSTF guidelines recommend a series of two vaccines for the best pneumonia protection.  
-All adults should have a flu vaccine yearly and a tetanus vaccine every 10 years. All adults age 61 and older should receive a shingles vaccine once in their lifetime.   
-A bone mass density test is recommended when a woman turns 65 to screen for osteoporosis. This test is only recommended one time, as a screening. Some providers will use this same test as a disease monitoring tool if you already have osteoporosis. -All adults age 38-68 who are overweight should have a diabetes screening test once every three years.  
-Other screening tests and preventive services for persons with diabetes include: an eye exam to screen for diabetic retinopathy, a kidney function test, a foot exam, and stricter control over your cholesterol.  
-Cardiovascular screening for adults with routine risk involves an electrocardiogram (ECG) at intervals determined by your doctor.  
-Colorectal cancer screenings should be done for adults age 54-65 with no increased risk factors for colorectal cancer. There are a number of acceptable methods of screening for this type of cancer. Each test has its own benefits and drawbacks. Discuss with your doctor what is most appropriate for you during your annual wellness visit. The different tests include: colonoscopy (considered the best screening method), a fecal occult blood test, a fecal DNA test, and sigmoidoscopy. -Breast cancer screenings are recommended every other year for women of normal risk, age 54-69. 
-Cervical cancer screenings for women over age 72 are only recommended with certain risk factors.  
-All adults born between [facility] should be screened once for Hepatitis C. Here is a list of your current Health Maintenance items (your personalized list of preventive services) with a due date: 
Health Maintenance Due Topic Date Due  Eye Exam  03/29/1943  
 DTaP/Tdap/Td  (1 - Tdap) 03/29/1954  Shingles Vaccine (1 of 2) 03/29/1983  Glaucoma Screening   03/29/1998  Bone Mineral Density   03/29/1998  Albumin Urine Test  09/22/2017  Cholesterol Test   09/22/2017  Hemoglobin A1C    01/26/2018 [facility] Diabetic [facility]  07/26/2018  Pneumococcal Vaccine (2 of 2 - PPSV23) 07/26/2018

## 2019-10-31 LAB
ALBUMIN SERPL-MCNC: 3.7 G/DL (ref 3.5–4.7)
ALBUMIN/GLOB SERPL: 1.2 {RATIO} (ref 1.2–2.2)
ALP SERPL-CCNC: 57 IU/L (ref 39–117)
ALT SERPL-CCNC: 22 IU/L (ref 0–32)
AST SERPL-CCNC: 21 IU/L (ref 0–40)
BILIRUB SERPL-MCNC: 0.3 MG/DL (ref 0–1.2)
BUN SERPL-MCNC: 27 MG/DL (ref 8–27)
BUN/CREAT SERPL: 25 (ref 12–28)
CALCIUM SERPL-MCNC: 8.7 MG/DL (ref 8.7–10.3)
CHLORIDE SERPL-SCNC: 101 MMOL/L (ref 96–106)
CHOLEST SERPL-MCNC: 218 MG/DL (ref 100–199)
CO2 SERPL-SCNC: 24 MMOL/L (ref 20–29)
CREAT SERPL-MCNC: 1.06 MG/DL (ref 0.57–1)
ERYTHROCYTE [DISTWIDTH] IN BLOOD BY AUTOMATED COUNT: 12.7 % (ref 12.3–15.4)
EST. AVERAGE GLUCOSE BLD GHB EST-MCNC: 163 MG/DL
GLOBULIN SER CALC-MCNC: 3.2 G/DL (ref 1.5–4.5)
GLUCOSE SERPL-MCNC: 172 MG/DL (ref 65–99)
HBA1C MFR BLD: 7.3 % (ref 4.8–5.6)
HCT VFR BLD AUTO: 39.1 % (ref 34–46.6)
HDLC SERPL-MCNC: 41 MG/DL
HGB BLD-MCNC: 13 G/DL (ref 11.1–15.9)
INTERPRETATION, 910389: NORMAL
INTERPRETATION: NORMAL
LDLC SERPL CALC-MCNC: 138 MG/DL (ref 0–99)
Lab: NORMAL
MCH RBC QN AUTO: 30.9 PG (ref 26.6–33)
MCHC RBC AUTO-ENTMCNC: 33.2 G/DL (ref 31.5–35.7)
MCV RBC AUTO: 93 FL (ref 79–97)
PDF IMAGE, 910387: NORMAL
PLATELET # BLD AUTO: 190 X10E3/UL (ref 150–450)
POTASSIUM SERPL-SCNC: 4.5 MMOL/L (ref 3.5–5.2)
PROT SERPL-MCNC: 6.9 G/DL (ref 6–8.5)
RBC # BLD AUTO: 4.21 X10E6/UL (ref 3.77–5.28)
SODIUM SERPL-SCNC: 138 MMOL/L (ref 134–144)
TRIGL SERPL-MCNC: 196 MG/DL (ref 0–149)
TSH SERPL DL<=0.005 MIU/L-ACNC: 5.17 UIU/ML (ref 0.45–4.5)
VLDLC SERPL CALC-MCNC: 39 MG/DL (ref 5–40)
WBC # BLD AUTO: 10.2 X10E3/UL (ref 3.4–10.8)

## 2019-11-04 ENCOUNTER — TELEPHONE (OUTPATIENT)
Dept: INTERNAL MEDICINE CLINIC | Age: 84
End: 2019-11-04

## 2019-11-04 NOTE — TELEPHONE ENCOUNTER
----- Message from Anthony Mathew sent at 11/4/2019  3:32 PM EST -----  Regarding: Dr. Mary Aranda Message/Vendor Calls    Caller's first and last name: Patient       Reason for call: Pt is returning a call from the office.        Callback required yes/no and why: yes       Best contact number(s): [phone]      Details to clarify the request:      Anthony Mathew

## 2019-11-11 ENCOUNTER — OFFICE VISIT (OUTPATIENT)
Dept: INTERNAL MEDICINE CLINIC | Age: 84
End: 2019-11-11

## 2019-11-11 VITALS
HEART RATE: 75 BPM | DIASTOLIC BLOOD PRESSURE: 80 MMHG | OXYGEN SATURATION: 95 % | RESPIRATION RATE: 16 BRPM | SYSTOLIC BLOOD PRESSURE: 184 MMHG | WEIGHT: 194 LBS | BODY MASS INDEX: 36.63 KG/M2 | HEIGHT: 61 IN | TEMPERATURE: 97.6 F

## 2019-11-11 DIAGNOSIS — E03.9 HYPOTHYROIDISM, UNSPECIFIED TYPE: ICD-10-CM

## 2019-11-11 DIAGNOSIS — I10 ESSENTIAL HYPERTENSION: Primary | ICD-10-CM

## 2019-11-11 DIAGNOSIS — E11.40 TYPE 2 DIABETES, CONTROLLED, WITH NEUROPATHY (HCC): ICD-10-CM

## 2019-11-11 RX ORDER — LISINOPRIL 20 MG/1
20 TABLET ORAL DAILY
Qty: 90 TAB | Refills: 1 | Status: SHIPPED | OUTPATIENT
Start: 2019-11-11 | End: 2021-03-02 | Stop reason: ALTCHOICE

## 2019-11-11 NOTE — PROGRESS NOTES
Subjective:     Leni Bender is a 80 y.o. female seen for follow-up of diabetes. She has had hypoglycemic attacks. .no  Blood sugar control has been good A1C =7.3  She has diabetes and hypertension. Leni Bender has the additional concern of some stress lately stopped valasartan made her sick. Diabetic Review of Systems: no polyuria or polydipsia, no chest pain, dyspnea or TIA's, no numbness, tingling or pain in extremities. Allergies   Allergen Reactions    Sulfa (Sulfonamide Antibiotics) Unproven on Challenge       Diet and Lifestyle: nonsmoker.     Patient Active Problem List    Diagnosis Date Noted    Severe obesity ([facility] 10/14/2019    Diabetic polyneuropathy associated with diabetes mellitus due to underlying condition ([facility] 2017    Hypertension 2011    Hyperlipidemia 2011    Type 2 diabetes, controlled, with neuropathy ([facility] 2011    Hypothyroidism 2011     Allergies   Allergen Reactions    Sulfa (Sulfonamide Antibiotics) Unproven on Challenge     Social History     Tobacco Use    Smoking status: Former Smoker     Packs/day: 1.00     Years: 20.00     Pack years: 20.00     Types: Cigarettes     Last attempt to quit: 2003     Years since quittin.5    Smokeless tobacco: Never Used   Substance Use Topics    Alcohol use: No        Lab Results   Component Value Date/Time    GFR est non-AA 48 (L) 10/30/2019 09:16 AM    GFR est AA 55 (L) 10/30/2019 09:16 AM    Creatinine 1.06 (H) 10/30/2019 09:16 AM    BUN 27 10/30/2019 09:16 AM    Sodium 138 10/30/2019 09:16 AM    Potassium 4.5 10/30/2019 09:16 AM    Chloride 101 10/30/2019 09:16 AM    CO2 24 10/30/2019 09:16 AM     Lab Results   Component Value Date/Time    TSH 5.170 (H) 10/30/2019 09:16 AM    T3 Uptake 32 2010 09:05 AM    T4, Free 1.45 2014 08:59 AM    T4, Total 8.5 2010 09:05 AM      Lab Results   Component Value Date/Time    Glucose 172 (H) 10/30/2019 09:16 AM         Review of Systems  Pertinent items are noted in HPI. Objective:     Significant for the following:     Visit Vitals  Pulse 75   Temp 97.6 °F (36.4 °C) (Oral)   Resp 16   Ht 5' 1\" (1.549 m)   Wt 194 lb (88 kg)   SpO2 95%   BMI 36.66 kg/m²     Appearance: alert, well appearing, and in no distress and overweight. Exam: heart sounds normal rate, regular rhythm, normal S1, S2, no murmurs, rubs, clicks or gallops, chest clear  Foot exam: deferred    Lab review: labs reviewed, I note that glycosylated hemoglobin mildly abnormal but acceptable. Assessment/Plan:     Follow-up diabetes stable. Diabetic issues reviewed with her: all medications, side effects and compliance discussed carefully, glycohemoglobin and other lab monitoring discussed and long term diabetic complications discussed. Chronic Conditions Addressed Today     1. Hypertension - Primary     Relevant Medications     lisinopril (PRINIVIL, ZESTRIL) 20 mg tablet     Other Relevant Orders     METABOLIC PANEL, BASIC    2. Type 2 diabetes, controlled, with neuropathy (HCC)     Relevant Medications     lisinopril (PRINIVIL, ZESTRIL) 20 mg tablet    3. Hypothyroidism        Discussed possible side affects, precautions, and drug interactions and possible benefits of the medication(s). Thyroid sl inc dose of synthroid  CXhange vlasartan to lisinopril    Follow-up and Dispositions    · Return in about 4 weeks (around 12/9/2019) for routine follow up.

## 2019-11-11 NOTE — PROGRESS NOTES
Chief Complaint   Patient presents with   [facility]     lab results     Hypertension     pt not taking her valsarton due to nausea (only took 1 dose)  ANXIETY level high     I have reviewed the patient's medical history in detail and updated the computerized patient record. Health Maintenance reviewed. 1. Have you been to the ER, urgent care clinic since your last visit? Hospitalized since your last visit?no    2. Have you seen or consulted any other health care providers outside of the [address] [place] [address] since your last visit? Include any pap smears or colon screening. No      Encouraged pt to discuss pt's wishes with spouse/partner/family and bring them in the next appt to follow thru with the Advanced Directive    Fall Risk Assessment, last 12 mths 11/11/2019   Able to walk? Yes   Fall in past 12 months? No       3 most recent PHQ Screens 11/11/2019   Little interest or pleasure in doing things Several days   Feeling down, depressed, irritable, or hopeless Several days   Total Score PHQ 2 2       Abuse Screening Questionnaire 10/14/2019   Do you ever feel afraid of your partner? N   Are you in a relationship with someone who physically or mentally threatens you? N   Is it safe for you to go home?  Y       ADL Assessment 10/14/2019   Feeding yourself No Help Needed   Getting from bed to chair No Help Needed   Getting dressed No Help Needed   Bathing or showering No Help Needed   Walk across the room (includes cane/walker) No Help Needed   Using the telphone No Help Needed   Taking your medications No Help Needed   Preparing meals No Help Needed   Managing money (expenses/bills) No Help Needed   Moderately strenuous housework (laundry) No Help Needed   Shopping for personal items (toiletries/medicines) No Help Needed   Shopping for groceries No Help Needed   Driving No Help Needed   Climbing a flight of stairs Help Needed   Getting to places beyond walking distances No Help Needed

## 2020-04-26 DIAGNOSIS — E03.9 HYPOTHYROIDISM, UNSPECIFIED TYPE: ICD-10-CM

## 2020-04-27 RX ORDER — LEVOTHYROXINE SODIUM 88 UG/1
88 TABLET ORAL
Qty: 90 TAB | Refills: 1 | Status: SHIPPED | OUTPATIENT
Start: 2020-04-27 | End: 2020-10-23 | Stop reason: SDUPTHER

## 2020-05-27 DIAGNOSIS — I10 ESSENTIAL HYPERTENSION: ICD-10-CM

## 2020-05-27 RX ORDER — PROPRANOLOL HYDROCHLORIDE 20 MG/1
TABLET ORAL
Qty: 270 TAB | Refills: 1 | Status: SHIPPED | OUTPATIENT
Start: 2020-05-27 | End: 2020-11-30

## 2020-06-21 DIAGNOSIS — I10 ESSENTIAL HYPERTENSION: ICD-10-CM

## 2020-06-21 RX ORDER — AMILORIDE HYDROCHLORIDE 5 MG/1
TABLET ORAL
Qty: 90 TAB | Refills: 1 | Status: SHIPPED | OUTPATIENT
Start: 2020-06-21 | End: 2020-12-05

## 2020-10-23 DIAGNOSIS — E03.9 HYPOTHYROIDISM, UNSPECIFIED TYPE: ICD-10-CM

## 2020-10-23 RX ORDER — LEVOTHYROXINE SODIUM 88 UG/1
88 TABLET ORAL
Qty: 90 TAB | Refills: 0 | Status: SHIPPED | OUTPATIENT
Start: 2020-10-23 | End: 2021-01-18

## 2021-03-02 ENCOUNTER — VIRTUAL VISIT (OUTPATIENT)
Dept: INTERNAL MEDICINE CLINIC | Age: 86
End: 2021-03-02
Payer: MEDICARE

## 2021-03-02 DIAGNOSIS — Z13.31 SCREENING FOR DEPRESSION: ICD-10-CM

## 2021-03-02 DIAGNOSIS — E11.40 TYPE 2 DIABETES, CONTROLLED, WITH NEUROPATHY (HCC): ICD-10-CM

## 2021-03-02 DIAGNOSIS — Z00.00 MEDICARE ANNUAL WELLNESS VISIT, SUBSEQUENT: Primary | ICD-10-CM

## 2021-03-02 DIAGNOSIS — E78.2 MIXED HYPERLIPIDEMIA: ICD-10-CM

## 2021-03-02 DIAGNOSIS — E03.9 HYPOTHYROIDISM, UNSPECIFIED TYPE: ICD-10-CM

## 2021-03-02 DIAGNOSIS — I10 ESSENTIAL HYPERTENSION: ICD-10-CM

## 2021-03-02 DIAGNOSIS — Z13.6 SCREENING FOR ISCHEMIC HEART DISEASE: ICD-10-CM

## 2021-03-02 DIAGNOSIS — Z13.1 SCREENING FOR DIABETES MELLITUS: ICD-10-CM

## 2021-03-02 PROCEDURE — G8427 DOCREV CUR MEDS BY ELIG CLIN: HCPCS | Performed by: FAMILY MEDICINE

## 2021-03-02 PROCEDURE — G0439 PPPS, SUBSEQ VISIT: HCPCS | Performed by: FAMILY MEDICINE

## 2021-03-02 PROCEDURE — 99214 OFFICE O/P EST MOD 30 MIN: CPT | Performed by: FAMILY MEDICINE

## 2021-03-02 PROCEDURE — G8510 SCR DEP NEG, NO PLAN REQD: HCPCS | Performed by: FAMILY MEDICINE

## 2021-03-02 PROCEDURE — G8421 BMI NOT CALCULATED: HCPCS | Performed by: FAMILY MEDICINE

## 2021-03-02 PROCEDURE — 1090F PRES/ABSN URINE INCON ASSESS: CPT | Performed by: FAMILY MEDICINE

## 2021-03-02 PROCEDURE — G8536 NO DOC ELDER MAL SCRN: HCPCS | Performed by: FAMILY MEDICINE

## 2021-03-02 PROCEDURE — 1101F PT FALLS ASSESS-DOCD LE1/YR: CPT | Performed by: FAMILY MEDICINE

## 2021-03-02 RX ORDER — LEVOTHYROXINE SODIUM 88 UG/1
88 TABLET ORAL
Qty: 90 TAB | Refills: 1 | Status: SHIPPED | OUTPATIENT
Start: 2021-03-02 | End: 2021-08-26

## 2021-03-02 RX ORDER — PROPRANOLOL HYDROCHLORIDE 20 MG/1
20 TABLET ORAL 3 TIMES DAILY
Qty: 270 TAB | Refills: 1 | Status: SHIPPED | OUTPATIENT
Start: 2021-03-02 | End: 2021-10-24

## 2021-03-02 RX ORDER — AMILORIDE HYDROCHLORIDE 5 MG/1
TABLET ORAL
Qty: 90 TAB | Refills: 1 | Status: SHIPPED | OUTPATIENT
Start: 2021-03-02 | End: 2021-07-20

## 2021-03-02 NOTE — PROGRESS NOTES
Chief Complaint   Patient presents with    Hypertension     med refill     Health Maintenance reviewed. I have reviewed the patient's medical history in detail and updated the computerized patient record. Patient has not been out of the country in (12 months), NO diarrhea, NO cough, NO chest conjestion, NO temp. Pt has not been around anyone with these symptoms. 1. Have you been to the ER, urgent care clinic since your last visit? Hospitalized since your last visit?no    2. Have you seen or consulted any other health care providers outside of the [address] [place] [address] since your last visit? Include any pap smears or colon screening. No      Encouraged pt to discuss pt's wishes with spouse/partner/family and bring them in the next appt to follow thru with the Advanced Directive    Fall Risk Assessment, last 12 mths 3/2/2021   Able to walk? Yes   Fall in past 12 months? 0   Do you feel unsteady? 0   Are you worried about falling 0       3 most recent PHQ Screens 3/2/2021   Little interest or pleasure in doing things Several days   Feeling down, depressed, irritable, or hopeless Several days   Total Score PHQ 2 2       Abuse Screening Questionnaire 3/2/2021   Do you ever feel afraid of your partner? N   Are you in a relationship with someone who physically or mentally threatens you? N   Is it safe for you to go home?  Y       ADL Assessment 3/2/2021   Feeding yourself No Help Needed   Getting from bed to chair No Help Needed   Getting dressed No Help Needed   Bathing or showering No Help Needed   Walk across the room (includes cane/walker) No Help Needed   Using the telphone No Help Needed   Taking your medications No Help Needed   Preparing meals No Help Needed   Managing money (expenses/bills) No Help Needed   Moderately strenuous housework (laundry) Help Needed   Shopping for personal items (toiletries/medicines) No Help Needed   Shopping for groceries No Help Needed   Driving No Help Needed Climbing a flight of stairs Help Needed   Getting to places beyond walking distances No Help Needed

## 2021-03-02 NOTE — PROGRESS NOTES
This is the Subsequent Medicare Annual Wellness Exam, performed 12 months or more after the Initial AWV or the last Subsequent AWV    I have reviewed the patient's medical history in detail and updated the computerized patient record. Depression Risk Factor Screening:     3 most recent PHQ Screens 3/2/2021   Little interest or pleasure in doing things Several days   Feeling down, depressed, irritable, or hopeless Several days   Total Score PHQ 2 2       Alcohol Risk Screen    Do you average more than 1 drink per night or more than 7 drinks a week:  No    On any one occasion in the past three months have you have had more than 3 drinks containing alcohol:  No    Only A little depressed despite isolation  I don't need covid vaccine. Functional Ability and Level of Safety:    Hearing: Hearing is good. Activities of Daily Living: The home contains: no safety equipment. Patient does total self care      Ambulation: with difficulty, uses a walker     Fall Risk:  Fall Risk Assessment, last 12 mths 3/2/2021   Able to walk? Yes   Fall in past 12 months? 0   Do you feel unsteady? 0   Are you worried about falling 0      Abuse Screen:  Patient is not abused       Cognitive Screening    Has your family/caregiver stated any concerns about your memory: no     Cognitive Screening: Normal - 10/11    Assessment/Plan   Education and counseling provided:  Are appropriate based on today's review and evaluation  Pneumococcal Vaccine  Influenza Vaccine  Cardiovascular screening blood test  Diabetes screening test  We discussed the covid vaccine. Discussed availability and how we prioritize patients to get it. Resources discussed. We discussed how important it is to get the vaccine and the relatively low side affects from it. Diagnoses and all orders for this visit:    1. Medicare annual wellness visit, subsequent    2. Screening for depression  -     DEPRESSION SCREEN ANNUAL    3.  Screening for ischemic heart disease    4.  Screening for diabetes mellitus        Health Maintenance Due     Health Maintenance Due   Topic Date Due    Eye Exam Retinal or Dilated  Never done    COVID-19 Vaccine (1 of 2) Never done    DTaP/Tdap/Td series (1 - Tdap) Never done    Shingrix Vaccine Age 50> (1 of 2) Never done    GLAUCOMA SCREENING Q2Y  Never done    Bone Densitometry (Dexa) Screening  Never done    MICROALBUMIN Q1  09/22/2017    Foot Exam Q1  07/26/2018    Pneumococcal 65+ years (2 of 2 - PPSV23) 07/26/2018    Flu Vaccine (1) Never done     Bone scan [place]    Patient Care Team   Patient Care Team:  Og Knapp MD as PCP - General (Family Medicine)  Og Knapp MD as PCP - [facility] Empaneled Provider    History     Patient Active Problem List   Diagnosis Code    Hypertension I10    Hyperlipidemia E78.5    Type 2 diabetes, controlled, with neuropathy ([address] E11.40    Hypothyroidism E03.9    Diabetic polyneuropathy associated with diabetes mellitus due to underlying condition ([address] E08.42    Severe obesity ([address] E66.01     Past Medical History:   Diagnosis Date    Allergic rhinitis     Diabetes mellitus 11/2014    Grave's disease 2001    Dr. Deirdre Copeland Hyperlipidemia     Hypertension     Hypothyroidism 2001    Osteopenia 2006    Peripheral neuropathy       Past Surgical History:   Procedure Laterality Date    HX CHOLECYSTECTOMY  1994    [place] [address]     Current Outpatient Medications   Medication Sig Dispense Refill    propranoloL (INDERAL) 20 mg tablet TAKE 1 TABLET BY MOUTH THREE TIMES DAILY 90 Tab 0    levothyroxine (SYNTHROID) 88 mcg tablet TAKE 1 TABLET BY MOUTH DAILY BEFORE BREAKFAST AND TAKE 1& 1/2 TABLETS ON MONDAYS 45 Tab 0    aMILoride (MIDAMOR) 5 mg tablet TAKE 1 TABLET BY MOUTH EVERY DAY 90 Tab 0     Allergies   Allergen Reactions    Sulfa (Sulfonamide Antibiotics) Unproven on Challenge       Family History   Problem Relation Age of Onset    Lung Disease Sister    Lola Favorite Heart Disease Sister     Cancer Brother      Social History     Socioeconomic History    Marital status:      Spouse name: Not on file    Number of children: 3    Years of education: Not on file    Highest education level: Not on file   Occupational History    Occupation: retired   Social Needs    Financial resource strain: Not on file    Food insecurity     Worry: Not on file     Inability: Not on file   [employer] needs     Medical: Not on file     Non-medical: Not on file   Tobacco Use    Smoking status: Former Smoker     Packs/day: 1.00     Years: 20.00     Pack years: 20.00     Types: Cigarettes     Quit date: 2003     Years since quittin.8    Smokeless tobacco: Never Used   Substance and Sexual Activity    Alcohol use: No    Drug use: No    Sexual activity: Not Currently   Lifestyle    Physical activity     Days per week: Not on file     Minutes per session: Not on file    Stress: Not on file   Relationships    Social connections     Talks on phone: Not on file     Gets together: Not on file     Attends [employer] service: Not on file     Active member of club or organization: Not on file     Attends meetings of clubs or organizations: Not on file     Relationship status: Not on file    Intimate partner violence     Fear of current or ex partner: Not on file     Emotionally abused: Not on file     Physically abused: Not on file     Forced sexual activity: Not on file   Other Topics Concern    Not on file   Social History Narrative    Grandson lives with her         José Luis Kline, who was evaluated through a synchronous (real-time) audio only encounter, and/or her healthcare decision maker, is aware that it is a billable service, with coverage as determined by her insurance carrier. She provided verbal consent to proceed: Yes, and patient identification was verified.  It was conducted pursuant to the emergency declaration under the [address] Act, [facility] authority and the [employer] General Act. A caregiver was present when appropriate. Ability to conduct physical exam was limited. I was in the office. The patient was at home. Rick Kwong MD     Alonzo Yanez is a 80 y.o. female who was phone evaluated on 3/2/2021. Consent:  She and/or her healthcare decision maker is aware that this patient-initiated Telehealth encounter is a billable service, with coverage as determined by her insurance carrier. She is aware that she may receive a bill and has provided verbal consent to proceed: Yes    I was in the office while conducting this encounter. Assessment & Plan:   Diagnoses and all orders for this visit:    1. Medicare annual wellness visit, subsequent    2. Screening for depression  -     DEPRESSION SCREEN ANNUAL    3. Screening for ischemic heart disease    4. Screening for diabetes mellitus    5. Type 2 diabetes, controlled, with neuropathy ([facility] [address]  -     HEMOGLOBIN A1C WITH EAG; Future  -     MICROALBUMIN, UR, RAND W/ MICROALB/CREAT RATIO; Future    6. Hypothyroidism, unspecified type  -     TSH 3RD GENERATION; Future  -     levothyroxine (SYNTHROID) 88 mcg tablet; Take 1 Tab by mouth Daily (before breakfast). 7. Mixed hyperlipidemia  -     LIPID PANEL; Future    8. Essential hypertension  -     METABOLIC PANEL, COMPREHENSIVE; Future  -     propranoloL (INDERAL) 20 mg tablet; Take 1 Tab by mouth three (3) times daily. -     aMILoride (MIDAMOR) 5 mg tablet; TAKE 1 TABLET BY MOUTH EVERY DAY  Indications: high blood pressure  -     CBC W/O DIFF; Future            712  Subjective:      Needs RF, NO c/o. I know Don't have much longer time on this earth. Reports taking blood pressure medications without side affects. No complaints of exertional chest pain, usu shortness of breath no focal weakness. Minimal swelling in lower legs or dizziness with standing.  [employer] [place], NO WLWG    Current Outpatient Medications   Medication Sig Dispense Refill    propranoloL (INDERAL) 20 mg tablet Take 1 Tab by mouth three (3) times daily. 270 Tab 1    levothyroxine (SYNTHROID) 88 mcg tablet Take 1 Tab by mouth Daily (before breakfast). 90 Tab 1    aMILoride (MIDAMOR) 5 mg tablet TAKE 1 TABLET BY MOUTH EVERY DAY  Indications: high blood pressure 90 Tab 1     Allergies   Allergen Reactions    Sulfa (Sulfonamide Antibiotics) Unproven on Challenge         We discussed the expected course, resolution and complications of the diagnosis(es) in detail. Medication risks, benefits, costs, interactions, and alternatives were discussed as indicated. I advised her to contact the office if her condition worsens, changes or fails to improve as anticipated. She expressed understanding with the diagnosis(es) and plan. Pursuant to the emergency declaration under the [facility] [place]note 2][facility], [facility] waiver authority and the [employer] General Act, this Virtual  Visit was conducted, with patient's consent, to reduce the patient's risk of exposure to COVID-19 and provide continuity of care for an established patient. Services were provided through a video synchronous discussion virtually to substitute for in-person clinic visit.     Haydee Rae MD

## 2021-03-02 NOTE — PATIENT INSTRUCTIONS
Medicare Wellness Visit, Female The best way to live healthy is to have a lifestyle where you eat a well-balanced diet, exercise regularly, limit alcohol use, and quit all forms of tobacco/nicotine, if applicable. Regular preventive services are another way to keep healthy. Preventive services (vaccines, screening tests, monitoring & exams) can help personalize your care plan, which helps you manage your own care. Screening tests can find health problems at the earliest stages, when they are easiest to treat. [facility] follows the current, evidence-based guidelines published by the [facility] ([facility]) when recommending preventive services for our patients. Because we follow these guidelines, sometimes recommendations change over time as research supports it. (For example, mammograms used to be recommended annually. Even though Medicare will still pay for an annual mammogram, the newer guidelines recommend a mammogram every two years for women of average risk). Of course, you and your doctor may decide to screen more often for some diseases, based on your risk and your co-morbidities (chronic disease you are already diagnosed with). Preventive services for you include: - Medicare offers their members a free annual wellness visit, which is time for you and your primary care provider to discuss and plan for your preventive service needs. Take advantage of this benefit every year! 
-All adults over the age of 72 should receive the recommended pneumonia vaccines. Current USPSTF guidelines recommend a series of two vaccines for the best pneumonia protection.  
-All adults should have a flu vaccine yearly and a tetanus vaccine every 10 years.  
-All adults age 48 and older should receive the shingles vaccines (series of two vaccines). -All adults age 38-68 who are overweight should have a diabetes screening test once every three years. -All adults born between [birth year] and 1965 should be screened once for Hepatitis C. 
-Other screening tests and preventive services for persons with diabetes include: an eye exam to screen for diabetic retinopathy, a kidney function test, a foot exam, and stricter control over your cholesterol.  
-Cardiovascular screening for adults with routine risk involves an electrocardiogram (ECG) at intervals determined by your doctor.  
-Colorectal cancer screenings should be done for adults age 54-65 with no increased risk factors for colorectal cancer. There are a number of acceptable methods of screening for this type of cancer. Each test has its own benefits and drawbacks. Discuss with your doctor what is most appropriate for you during your annual wellness visit. The different tests include: colonoscopy (considered the best screening method), a fecal occult blood test, a fecal DNA test, and sigmoidoscopy. 
 
-A bone mass density test is recommended when a woman turns 65 to screen for osteoporosis. This test is only recommended one time, as a screening. Some providers will use this same test as a disease monitoring tool if you already have osteoporosis. -Breast cancer screenings are recommended every other year for women of normal risk, age 54-69. 
-Cervical cancer screenings for women over age 72 are only recommended with certain risk factors. Here is a list of your current Health Maintenance items (your personalized list of preventive services) with a due date: 
Health Maintenance Due Topic Date Due  Eye Exam  Never done  COVID-19 Vaccine (1 of 2) Never done  DTaP/Tdap/Td  (1 - Tdap) Never done  Shingles Vaccine (1 of 2) Never done  Glaucoma Screening   Never done  Bone Mineral Density   Never done  Albumin Urine Test  09/22/2017 [facility]  07/26/2018  Pneumococcal Vaccine (2 of 2 - PPSV23) 07/26/2018  Yearly Flu Vaccine (1) Never done

## 2021-07-20 DIAGNOSIS — I10 ESSENTIAL HYPERTENSION: ICD-10-CM

## 2021-07-20 RX ORDER — AMILORIDE HYDROCHLORIDE 5 MG/1
TABLET ORAL
Qty: 90 TABLET | Refills: 1 | Status: SHIPPED | OUTPATIENT
Start: 2021-07-20 | End: 2022-09-13

## 2021-08-25 DIAGNOSIS — E03.9 HYPOTHYROIDISM, UNSPECIFIED TYPE: ICD-10-CM

## 2021-08-26 RX ORDER — LEVOTHYROXINE SODIUM 88 UG/1
TABLET ORAL
Qty: 90 TABLET | Refills: 1 | Status: SHIPPED | OUTPATIENT
Start: 2021-08-26 | End: 2022-03-08

## 2022-03-18 PROBLEM — E08.42 DIABETIC POLYNEUROPATHY ASSOCIATED WITH DIABETES MELLITUS DUE TO UNDERLYING CONDITION (HCC): Status: ACTIVE | Noted: 2017-07-29

## 2022-03-19 PROBLEM — E66.01 SEVERE OBESITY (HCC): Status: ACTIVE | Noted: 2019-10-14

## 2022-03-26 DIAGNOSIS — E03.9 HYPOTHYROIDISM, UNSPECIFIED TYPE: ICD-10-CM

## 2022-03-26 RX ORDER — LEVOTHYROXINE SODIUM 88 UG/1
88 TABLET ORAL
Qty: 90 TABLET | Refills: 0 | Status: SHIPPED | OUTPATIENT
Start: 2022-03-26 | End: 2022-06-15

## 2022-12-12 DIAGNOSIS — I10 ESSENTIAL HYPERTENSION: ICD-10-CM

## 2022-12-12 RX ORDER — AMILORIDE HYDROCHLORIDE 5 MG/1
TABLET ORAL
Qty: 90 TABLET | Refills: 0 | Status: SHIPPED | OUTPATIENT
Start: 2022-12-12

## 2023-04-25 ENCOUNTER — TELEPHONE (OUTPATIENT)
Dept: FAMILY MEDICINE CLINIC | Age: 88
End: 2023-04-25

## 2023-04-25 NOTE — TELEPHONE ENCOUNTER
Received a lunchtime call fowarded  to me by Cynthia. Patient's daughter hSeela Knicaid and an APS  with [facility] are really worried about patient. Callback number [phone]    Patient has an infected toe draining white-green pus and red streaks up her leg. Over the last 3 days she has declined. Is less mobile. Fading in and out of consciousness. While awake is \"talking out of her head\". For instance last night about how she plans to burn the house down    EMS has activated a few times over the last 3 days because of falls. She has refused transport. This does seem to be in keeping with her character over the last 3 years since she has not wanted to go to doctor's office or emergency room during the pandemic    Evidently EMS and law enforcement were both there earlier today. Daughter urged transport but patient refused and so they suggested she call PCP    Apparently after EMS visit patient has barely been able to stay awake and in fact sleeping currently. Before she fell asleep patient did say that she would be willing to seek care with PCP. I cannot speak to patient because she is asleep. Daughter's story is corroborated by       Based on description she appears to have a diabetic foot infection and likely osteomyelitis and resulting delirium  Based on description she is at high risk for decline, sepsis, death within the next 24 hours  This cannot be managed outpatient, she needs IV antibiotics in an emergency room and likely admission      If she is delirious then she is not going to be capable of making informed medical decision then decision-making would be left to her next of kin, the daughter Eliseo Bird) that I am speaking with on the phone      Law enforcement does not appear to know how to proceed in this situation. The appropriate avenue is an ECO or simply transport at the wishes of acting power of .   My suggestion is to reactivate EMS and have them speak with their medical command to arrange appropriate transport to treatment    The backup option would be to ask EMS to assist them to her POV to allow them to go to emergency room POV

## 2023-06-12 RX ORDER — AMILORIDE HYDROCHLORIDE 5 MG/1
TABLET ORAL
Qty: 90 TABLET | Refills: 0 | Status: SHIPPED | OUTPATIENT
Start: 2023-06-12

## 2023-06-12 RX ORDER — PROPRANOLOL HYDROCHLORIDE 20 MG/1
TABLET ORAL
Qty: 270 TABLET | Refills: 0 | Status: SHIPPED | OUTPATIENT
Start: 2023-06-12

## 2023-06-19 RX ORDER — LEVOTHYROXINE SODIUM 88 UG/1
TABLET ORAL
Qty: 90 TABLET | OUTPATIENT
Start: 2023-06-19